# Patient Record
Sex: FEMALE | Race: BLACK OR AFRICAN AMERICAN
[De-identification: names, ages, dates, MRNs, and addresses within clinical notes are randomized per-mention and may not be internally consistent; named-entity substitution may affect disease eponyms.]

---

## 2020-05-06 ENCOUNTER — HOSPITAL ENCOUNTER (EMERGENCY)
Dept: HOSPITAL 97 - ER | Age: 45
LOS: 1 days | Discharge: HOME | End: 2020-05-07
Payer: SELF-PAY

## 2020-05-06 DIAGNOSIS — S46.912A: ICD-10-CM

## 2020-05-06 DIAGNOSIS — S16.1XXA: Primary | ICD-10-CM

## 2020-05-06 DIAGNOSIS — S76.012A: ICD-10-CM

## 2020-05-06 DIAGNOSIS — K57.90: ICD-10-CM
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
## 2020-05-06 LAB
BUN BLD-MCNC: 15 MG/DL (ref 7–18)
GLUCOSE SERPLBLD-MCNC: 104 MG/DL (ref 74–106)
HCT VFR BLD CALC: 40.9 % (ref 36–45)
LYMPHOCYTES # SPEC AUTO: 1.9 K/UL (ref 0.7–4.9)
PMV BLD: 8.2 FL (ref 7.6–11.3)
POTASSIUM SERPL-SCNC: 5 MMOL/L (ref 3.5–5.1)
RBC # BLD: 4.95 M/UL (ref 3.86–4.86)

## 2020-05-06 PROCEDURE — 70450 CT HEAD/BRAIN W/O DYE: CPT

## 2020-05-06 PROCEDURE — 99285 EMERGENCY DEPT VISIT HI MDM: CPT

## 2020-05-06 PROCEDURE — 72125 CT NECK SPINE W/O DYE: CPT

## 2020-05-06 PROCEDURE — 71045 X-RAY EXAM CHEST 1 VIEW: CPT

## 2020-05-06 PROCEDURE — 36415 COLL VENOUS BLD VENIPUNCTURE: CPT

## 2020-05-06 PROCEDURE — 80048 BASIC METABOLIC PNL TOTAL CA: CPT

## 2020-05-06 PROCEDURE — 74177 CT ABD & PELVIS W/CONTRAST: CPT

## 2020-05-06 PROCEDURE — 85025 COMPLETE CBC W/AUTO DIFF WBC: CPT

## 2020-05-06 PROCEDURE — 72170 X-RAY EXAM OF PELVIS: CPT

## 2020-05-06 PROCEDURE — 71260 CT THORAX DX C+: CPT

## 2020-05-06 NOTE — RAD REPORT
EXAM DESCRIPTION:  Mk Single View5/6/2020 9:21 pm

 

CLINICAL HISTORY:  Chest pain

 

COMPARISON:  none

 

FINDINGS:   The lungs appear clear of acute infiltrate. The heart is normal size

 

IMPRESSION:   No acute abnormalities displayed
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
## 2020-05-06 NOTE — RAD REPORT
EXAM DESCRIPTION:  RAD - Femur Left - 5/6/2020 9:21 pm

 

CLINICAL HISTORY:  Left leg pain

 

FINDINGS:  No fracture seen

## 2020-05-06 NOTE — RAD REPORT
EXAM DESCRIPTION:  RAD - Pelvis - 5/6/2020 9:21 pm

 

CLINICAL HISTORY:  Pelvic pain status post injury

 

FINDINGS:  No fracture or dislocation is seen.

## 2020-05-06 NOTE — RAD REPORT
EXAM DESCRIPTION:  RAD -Hand Left 3 View - 5/6/2020 9:21 pm

 

CLINICAL HISTORY:

Left hand pain status post injury

 

FINDINGS:  No fracture or dislocation is seen.

## 2020-05-06 NOTE — RAD REPORT
EXAM DESCRIPTION:  RAD - Humerus Left - 5/6/2020 9:21 pm

 

CLINICAL HISTORY:   Left arm pain

 

FINDINGS:   No fracture is seen

## 2020-05-07 VITALS — OXYGEN SATURATION: 100 % | SYSTOLIC BLOOD PRESSURE: 128 MMHG | DIASTOLIC BLOOD PRESSURE: 85 MMHG

## 2020-05-07 VITALS — TEMPERATURE: 98 F
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
